# Patient Record
Sex: MALE | Race: ASIAN | NOT HISPANIC OR LATINO
[De-identification: names, ages, dates, MRNs, and addresses within clinical notes are randomized per-mention and may not be internally consistent; named-entity substitution may affect disease eponyms.]

---

## 2021-09-20 PROBLEM — Z00.00 ENCOUNTER FOR PREVENTIVE HEALTH EXAMINATION: Status: ACTIVE | Noted: 2021-09-20

## 2021-09-27 ENCOUNTER — APPOINTMENT (OUTPATIENT)
Dept: CARDIOLOGY | Facility: CLINIC | Age: 76
End: 2021-09-27
Payer: MEDICARE

## 2021-09-27 VITALS
WEIGHT: 127 LBS | HEIGHT: 64.57 IN | OXYGEN SATURATION: 97 % | DIASTOLIC BLOOD PRESSURE: 71 MMHG | HEART RATE: 69 BPM | BODY MASS INDEX: 21.42 KG/M2 | RESPIRATION RATE: 18 BRPM | SYSTOLIC BLOOD PRESSURE: 166 MMHG | TEMPERATURE: 97.6 F

## 2021-09-27 DIAGNOSIS — I10 ESSENTIAL (PRIMARY) HYPERTENSION: ICD-10-CM

## 2021-09-27 DIAGNOSIS — R00.2 PALPITATIONS: ICD-10-CM

## 2021-09-27 DIAGNOSIS — I25.10 ATHEROSCLEROTIC HEART DISEASE OF NATIVE CORONARY ARTERY W/OUT ANGINA PECTORIS: ICD-10-CM

## 2021-09-27 DIAGNOSIS — Z87.891 PERSONAL HISTORY OF NICOTINE DEPENDENCE: ICD-10-CM

## 2021-09-27 DIAGNOSIS — Z98.61 CORONARY ANGIOPLASTY STATUS: ICD-10-CM

## 2021-09-27 PROCEDURE — 93000 ELECTROCARDIOGRAM COMPLETE: CPT

## 2021-09-27 PROCEDURE — 99204 OFFICE O/P NEW MOD 45 MIN: CPT

## 2021-09-27 RX ORDER — TAMSULOSIN HYDROCHLORIDE 0.4 MG/1
0.4 CAPSULE ORAL
Refills: 0 | Status: ACTIVE | COMMUNITY
Start: 2021-09-27

## 2021-09-27 RX ORDER — ASPIRIN 81 MG/1
81 TABLET ORAL
Refills: 0 | Status: ACTIVE | COMMUNITY
Start: 2021-09-27

## 2021-09-27 RX ORDER — FLUOXETINE HYDROCHLORIDE 10 MG/1
10 CAPSULE ORAL
Refills: 0 | Status: ACTIVE | COMMUNITY
Start: 2021-09-27

## 2021-09-27 NOTE — REASON FOR VISIT
[Hypertension] : hypertension [Coronary Artery Disease] : coronary artery disease [FreeTextEntry1] : 76 M HTN CAD s/p PCI 25 y with CP and SOB.

## 2021-09-27 NOTE — HISTORY OF PRESENT ILLNESS
[FreeTextEntry1] : 76 M HTN CAD s/p PCI with CP, SOB and palpitations.\par \par 1. HTN: on medications.\par 2. CAD s/p PCI: patient reports PCI 25 years ago at [facility]. He has not had cardiology f/u.\par He reports worsening CP and SOB over the last few months (about 6). His symptoms are exertional, relieved with rest, lasting minutes and associated with dizziness.\par His CP does not radiate but is bilateral and substernal. His symptoms last a few minutes. He has SOB and has limited ET but denies PND or orthopnea. \par He also has noted intermittent palpitations with dizziness.

## 2021-09-27 NOTE — DISCUSSION/SUMMARY
[FreeTextEntry1] : 76 M HTN CAD s/p PCI with CP, SOB and palpitations.\par CHECK ECHOCARDIOGRAM.\par CHECK HOLTER.\par CHECK NST TO ASSESS PERFUSION.\par Continue ASA 81 QD.

## 2021-09-27 NOTE — PHYSICAL EXAM
[Well Developed] : well developed [Well Nourished] : well nourished [No Acute Distress] : no acute distress [Normal Conjunctiva] : normal conjunctiva [Normal Venous Pressure] : normal venous pressure [No Carotid Bruit] : no carotid bruit [Normal S1, S2] : normal S1, S2 [No Rub] : no rub [No Gallop] : no gallop [Clear Lung Fields] : clear lung fields [Good Air Entry] : good air entry [No Respiratory Distress] : no respiratory distress  [Soft] : abdomen soft [Non Tender] : non-tender [No Masses/organomegaly] : no masses/organomegaly [Normal Bowel Sounds] : normal bowel sounds [Normal Gait] : normal gait [No Edema] : no edema [No Cyanosis] : no cyanosis [No Clubbing] : no clubbing [No Varicosities] : no varicosities [No Rash] : no rash [No Skin Lesions] : no skin lesions [Moves all extremities] : moves all extremities [No Focal Deficits] : no focal deficits [Normal Speech] : normal speech [Alert and Oriented] : alert and oriented [Normal memory] : normal memory [de-identified] : 1/6 ARIANE PAGE

## 2021-10-01 ENCOUNTER — NON-APPOINTMENT (OUTPATIENT)
Age: 76
End: 2021-10-01

## 2021-12-03 ENCOUNTER — APPOINTMENT (OUTPATIENT)
Dept: CARDIOLOGY | Facility: CLINIC | Age: 76
End: 2021-12-03
Payer: MEDICARE

## 2021-12-03 VITALS
SYSTOLIC BLOOD PRESSURE: 179 MMHG | WEIGHT: 124 LBS | DIASTOLIC BLOOD PRESSURE: 74 MMHG | OXYGEN SATURATION: 97 % | TEMPERATURE: 97.8 F | BODY MASS INDEX: 20.91 KG/M2 | HEART RATE: 59 BPM | RESPIRATION RATE: 18 BRPM

## 2021-12-03 DIAGNOSIS — R07.9 CHEST PAIN, UNSPECIFIED: ICD-10-CM

## 2021-12-03 PROCEDURE — 78452 HT MUSCLE IMAGE SPECT MULT: CPT

## 2021-12-03 PROCEDURE — 93015 CV STRESS TEST SUPVJ I&R: CPT

## 2021-12-03 PROCEDURE — 93306 TTE W/DOPPLER COMPLETE: CPT

## 2021-12-03 PROCEDURE — A9500: CPT

## 2021-12-15 ENCOUNTER — APPOINTMENT (OUTPATIENT)
Dept: CARDIOLOGY | Facility: CLINIC | Age: 76
End: 2021-12-15
Payer: MEDICARE

## 2021-12-15 VITALS
WEIGHT: 125 LBS | SYSTOLIC BLOOD PRESSURE: 156 MMHG | HEART RATE: 61 BPM | DIASTOLIC BLOOD PRESSURE: 78 MMHG | RESPIRATION RATE: 12 BRPM | BODY MASS INDEX: 21.34 KG/M2 | HEIGHT: 64 IN

## 2021-12-15 PROCEDURE — 99214 OFFICE O/P EST MOD 30 MIN: CPT

## 2021-12-15 RX ORDER — IRBESARTAN 300 MG/1
300 TABLET ORAL
Refills: 0 | Status: DISCONTINUED | COMMUNITY
Start: 2021-09-27 | End: 2021-12-15

## 2021-12-15 RX ORDER — FUROSEMIDE 20 MG/1
20 TABLET ORAL
Refills: 0 | Status: ACTIVE | COMMUNITY
Start: 2021-12-15

## 2021-12-15 NOTE — HISTORY OF PRESENT ILLNESS
[FreeTextEntry1] :   \par \par 1. HTN: on medications.\par 2. Hyperlipidemia: on statin.\par 3. CAD s/p PCI: patient reports PCI 25 years ago at [facility]. He has not had cardiology f/u.\par He had an echo, NST and holter done.\par Echo showed normal LVEF. NST showed an anterior wall fixed defect. Holter showed PACs.\par \par He was recently at [facility] for palpitations. He was discharged after BP control.\par \par He will be started on Toprol XL 25 QD. He continues to have exertional CP and SOB that occurs several times/ day and are relieved with rest.\par

## 2021-12-15 NOTE — REASON FOR VISIT
[Arrhythmia/ECG Abnorrmalities] : arrhythmia/ECG abnormalities [Hyperlipidemia] : hyperlipidemia [Hypertension] : hypertension [Coronary Artery Disease] : coronary artery disease [FreeTextEntry1] : 76 M HTN CAD s/p PCI 25 y with CP and SOB. \par

## 2021-12-15 NOTE — DISCUSSION/SUMMARY
[FreeTextEntry1] : 76 M HTN hyperlipidemia CAD s/p PCI with CP and SOB and palpitations.\par CHECK CARDIAC CTA.\par START TOPROL XL 25.\par Continue medications for HTN and hyperlipidemia.\par Continue ASA for CAD.

## 2021-12-15 NOTE — PHYSICAL EXAM
[Well Developed] : well developed [Well Nourished] : well nourished [No Acute Distress] : no acute distress [Normal Conjunctiva] : normal conjunctiva [Normal Venous Pressure] : normal venous pressure [No Carotid Bruit] : no carotid bruit [Normal S1, S2] : normal S1, S2 [No Rub] : no rub [No Gallop] : no gallop [Clear Lung Fields] : clear lung fields [Good Air Entry] : good air entry [No Respiratory Distress] : no respiratory distress  [Soft] : abdomen soft [Non Tender] : non-tender [No Masses/organomegaly] : no masses/organomegaly [Normal Bowel Sounds] : normal bowel sounds [Normal Gait] : normal gait [No Edema] : no edema [No Cyanosis] : no cyanosis [No Clubbing] : no clubbing [No Varicosities] : no varicosities [No Rash] : no rash [No Skin Lesions] : no skin lesions [Moves all extremities] : moves all extremities [No Focal Deficits] : no focal deficits [Normal Speech] : normal speech [Alert and Oriented] : alert and oriented [Normal memory] : normal memory [de-identified] : 1/6 ARIANE PAGE

## 2021-12-23 ENCOUNTER — NON-APPOINTMENT (OUTPATIENT)
Age: 76
End: 2021-12-23

## 2022-01-19 ENCOUNTER — APPOINTMENT (OUTPATIENT)
Dept: CARDIOLOGY | Facility: CLINIC | Age: 77
End: 2022-01-19
Payer: MEDICARE

## 2022-01-19 VITALS
HEART RATE: 76 BPM | WEIGHT: 125 LBS | HEIGHT: 64 IN | DIASTOLIC BLOOD PRESSURE: 82 MMHG | RESPIRATION RATE: 15 BRPM | SYSTOLIC BLOOD PRESSURE: 154 MMHG | BODY MASS INDEX: 21.34 KG/M2

## 2022-01-19 PROCEDURE — 93000 ELECTROCARDIOGRAM COMPLETE: CPT

## 2022-01-19 PROCEDURE — 99214 OFFICE O/P EST MOD 30 MIN: CPT

## 2022-01-19 RX ORDER — IRBESARTAN AND HYDROCHLOROTHIAZIDE 300; 12.5 MG/1; MG/1
300-12.5 TABLET ORAL
Refills: 0 | Status: ACTIVE | COMMUNITY
Start: 2022-01-19

## 2022-01-19 RX ORDER — METOPROLOL SUCCINATE 25 MG/1
25 TABLET, EXTENDED RELEASE ORAL DAILY
Qty: 30 | Refills: 1 | Status: DISCONTINUED | COMMUNITY
Start: 2021-12-15 | End: 2022-01-19

## 2022-01-19 RX ORDER — LABETALOL HYDROCHLORIDE 100 MG/1
100 TABLET, FILM COATED ORAL
Refills: 0 | Status: ACTIVE | COMMUNITY
Start: 2022-01-19

## 2022-01-19 RX ORDER — IRBESARTAN 300 MG/1
300 TABLET ORAL
Refills: 0 | Status: ACTIVE | COMMUNITY
Start: 2022-01-19

## 2022-01-19 RX ORDER — AMLODIPINE AND OLMESARTAN MEDOXOMIL 5; 40 MG/1; MG/1
5-40 TABLET ORAL
Refills: 0 | Status: DISCONTINUED | COMMUNITY
Start: 2021-12-15 | End: 2022-01-19

## 2022-01-19 NOTE — PHYSICAL EXAM
[Well Developed] : well developed [Well Nourished] : well nourished [No Acute Distress] : no acute distress [Normal Conjunctiva] : normal conjunctiva [Normal Venous Pressure] : normal venous pressure [No Carotid Bruit] : no carotid bruit [Normal S1, S2] : normal S1, S2 [No Rub] : no rub [No Gallop] : no gallop [Clear Lung Fields] : clear lung fields [Good Air Entry] : good air entry [No Respiratory Distress] : no respiratory distress  [Soft] : abdomen soft [Non Tender] : non-tender [No Masses/organomegaly] : no masses/organomegaly [Normal Bowel Sounds] : normal bowel sounds [Normal Gait] : normal gait [No Edema] : no edema [No Cyanosis] : no cyanosis [No Clubbing] : no clubbing [No Varicosities] : no varicosities [No Rash] : no rash [No Skin Lesions] : no skin lesions [Moves all extremities] : moves all extremities [No Focal Deficits] : no focal deficits [Normal Speech] : normal speech [Alert and Oriented] : alert and oriented [Normal memory] : normal memory [de-identified] : 1/6 ARIANE PAGE

## 2022-01-19 NOTE — REASON FOR VISIT
[Hyperlipidemia] : hyperlipidemia [Hypertension] : hypertension [Coronary Artery Disease] : coronary artery disease [FreeTextEntry1] : 76 M HTN CAD s/p PCI 25 y with CP and SOB. \par

## 2022-01-19 NOTE — DISCUSSION/SUMMARY
[FreeTextEntry1] : 76 M HTN hyperlipidemia CAD s/p PCI with worsening CP and abnormal CTA.\par Continue ASA 81 for CAD.\par Due to worsening symptoms and abnormal CTA, refer for cardiac catheterization.\par Continue medications for HTN and hyperlipidemia.\par NG 0.4 mg prn for CP.\par

## 2022-01-19 NOTE — CARDIOLOGY SUMMARY
Lab Results   Component Value Date    HGBA1C 6 1 (H) 05/16/2021       Recent Labs     05/19/21  1640 05/19/21  2110 05/20/21  0707 05/20/21  1106   POCGLU 119 155* 129 160*       Blood Sugar Average: Last 72 hrs:  (P) 132 [id number]   · Controlled continue sliding scale [de-identified] : 1-19-22 sinus joon 59 bpm nl axis no ST changes

## 2022-01-19 NOTE — HISTORY OF PRESENT ILLNESS
[FreeTextEntry1] :  \par 1. HTN: on medications. No SE are noted.\par 2. Hyperlipidemia: on statin. The lipid profile is followed by PMD.\par 3. CAD s/p PCI: patient reports PCI 25 years ago at [facility]. He has not had cardiology f/u.\par He had an echo, NST and holter done.\par Echo showed normal LVEF. NST showed an anterior wall fixed defect. Holter showed PACs.\par \par He was recently at [facility] for palpitations. He was discharged after BP control.\par \par His BP meds were changed to irbesartan and labetalol. His BP control has improved.\par He continues to have exertional CP and SOB.\par Cardiac CTA showed multiple stenosis including a moderate to severe lesion in the LAD.\par \par The patient received his COVID vaccine.

## 2022-01-24 VITALS
HEIGHT: 64.96 IN | TEMPERATURE: 100 F | RESPIRATION RATE: 16 BRPM | OXYGEN SATURATION: 95 % | HEART RATE: 62 BPM | SYSTOLIC BLOOD PRESSURE: 174 MMHG | WEIGHT: 125 LBS | DIASTOLIC BLOOD PRESSURE: 77 MMHG

## 2022-01-24 RX ORDER — CHLORHEXIDINE GLUCONATE 213 G/1000ML
1 SOLUTION TOPICAL ONCE
Refills: 0 | Status: DISCONTINUED | OUTPATIENT
Start: 2022-01-28 | End: 2022-02-11

## 2022-01-24 NOTE — H&P ADULT - NSHPLABSRESULTS_GEN_ALL_CORE
Labs:               12.6   5.85  )-----------( 231      ( 28 Jan 2022 11:06 )             39.0       01-28    140  |  104  |  17  ----------------------------<  112<H>  4.5   |  26  |  0.77    Ca    9.5      28 Jan 2022 11:06    TPro  7.5  /  Alb  4.7  /  TBili  0.5  /  DBili  x   /  AST  16  /  ALT  17  /  AlkPhos  63  01-28      PT/INR - ( 28 Jan 2022 11:06 )   PT: 12.5 sec;   INR: 1.04          PTT - ( 28 Jan 2022 11:06 )  PTT:34.7 sec    CARDIAC MARKERS ( 28 Jan 2022 11:06 )  x     / x     / 72 U/L / x     / 1.2 ng/mL      EKG: NSR 70bpm, L axis, no acute ischemia

## 2022-01-24 NOTE — H&P ADULT - ASSESSMENT
77yo Thai-speaking Male w/ PMHx of HTN, HLD, BPH, CAD (s/p PCI 25yrs ago at [facility]), prostate cancer (s/p surgery - 5 years ago), presents for cardiac cath in light of patient's risk factors, CCS Class III Anginal symptoms, and abnormal CCTA/NST.    -ASA II, Mallampati II  -suitable candidate for moderate sedation  -DAPT: pt compliant with aspirin, last dose 1/27; will give aspirin 81mg and plavix 600mg  -IVF: NS 250cc bolus (next case)  -T 100.2 but pt denies any infectious symptoms or sick contacts, no leukocytosis  -cath consent obtained via Language Line ID # [id number]    Risks & benefits of procedure and alternative therapy have been explained to the patient including but not limited to: allergic reaction, bleeding w/possible need for blood transfusion, infection, renal and vascular compromise, limb damage, arrhythmia, stroke, vessel dissection/perforation, Myocardial infarction, emergent CABG.

## 2022-01-24 NOTE — H&P ADULT - ATTENDING COMMENTS
Please see PA note for full details.  I have reviewed the case, examined the patient and agree with plan.  Patient underwent PCI for severe mid LAD and LCx stenosis.  Continue ASA and plavix.  Increase atorvastatin to 40mg hs.

## 2022-01-24 NOTE — H&P ADULT - HISTORY OF PRESENT ILLNESS
CARDIOLOGIST: Dr. Carvajal  COVID:   PHARMACY:   ESCORT:     Pt is 75yo Male w/ PMHx of HTN, HLD, BPH, CAD (s/p PCI 25yrs ago at [facility]) who presented to his cardiologist, Dr. Carvajal, endorsing exertional CP and SOB. Pt denies palpitations, dizziness/syncope, abdominal pain, N/V, fever/chills, orthopnea, PND, LE edema.     TTE (12/3/21): LVEF 70%, mild-mod TR, mild MR, PASP 43mmHg. CCTA (12/27/2021): Ca score 345 (81st %tile), moderate stenosis dLAD, mild stenosis LM/D1/RCA/PDA/PLV, minimal stenisosi LCx/OM. NST (12/3/21): small, mild defect anterior wall.    In light of patient's risk factors, CCS Class III Anginal symptoms, and abnormal CCTA/NST, patient now presents to [facility] for cardiac catheterization with possible intervention if clinically indicated.  CARDIOLOGIST: Dr. Carvajal  COVID: 1/25/22 at [facility])  PHARMACY:   ESCORT: Son, Yeo Yoon     confirm medications and pharmacy on arrival*     Pt is 77yo Male w/ PMHx of HTN, HLD, BPH, CAD (s/p PCI 25yrs ago at [facility]), prostate cancer (s/p surgery - 5 years ago),  who presented to his cardiologist, Dr. Carvajal, endorsing exertional CP and SOB. Pt denies palpitations, dizziness/syncope, abdominal pain, N/V, fever/chills, orthopnea, PND, LE edema. TTE (12/3/21): LVEF 70%, mild-mod TR, mild MR, PASP 43mmHg. CCTA (12/27/2021): Ca score 345 (81st %tile), moderate stenosis dLAD, mild stenosis LM/D1/RCA/PDA/PLV, minimal stenisosi LCx/OM. NST (12/3/21): small, mild defect anterior wall.    In light of patient's risk factors, CCS Class III Anginal symptoms, and abnormal CCTA/NST, patient now presents to [facility] for cardiac catheterization with possible intervention if clinically indicated.  CARDIOLOGIST: Dr. Carvajal  COVID: 1/25/22 at [facility])-Negative in HIE  PHARMACY:   ESCORT: Son, Yeo Yoon     confirm medications and pharmacy on arrival*     Pt is 77yo Male w/ PMHx of HTN, HLD, BPH, CAD (s/p PCI 25yrs ago at [facility]), prostate cancer (s/p surgery - 5 years ago),  who presented to his cardiologist, Dr. Carvajal, endorsing exertional CP and SOB. Pt denies palpitations, dizziness/syncope, abdominal pain, N/V, fever/chills, orthopnea, PND, LE edema. TTE (12/3/21): LVEF 70%, mild-mod TR, mild MR, PASP 43mmHg. CCTA (12/27/2021): Ca score 345 (81st %tile), moderate stenosis dLAD, mild stenosis LM/D1/RCA/PDA/PLV, minimal stenisosi LCx/OM. NST (12/3/21): small, mild defect anterior wall.    In light of patient's risk factors, CCS Class III Anginal symptoms, and abnormal CCTA/NST, patient now presents to [facility] for cardiac catheterization with possible intervention if clinically indicated.  CARDIOLOGIST: Dr. Carvajal  COVID: 1/25/22 at [facility])-Negative in HIE  PHARMACY: [facility], [place]  ESCORT: Son, Yeo Yoon     75yo Bulgarian-speaking Male w/ PMHx of HTN, HLD, BPH, CAD (s/p PCI 25yrs ago at [facility]), prostate cancer (s/p surgery - 5 years ago),  who presented to his cardiologist, Dr. Carvajal, endorsing exertional CP and SOB. Pt denies palpitations, dizziness/syncope, abdominal pain, N/V, fever/chills, orthopnea, PND, LE edema, URI symptoms, or sick contacts. TTE (12/3/21): LVEF 70%, mild-mod TR, mild MR, PASP 43mmHg. CCTA (12/27/2021): Ca score 345 (81st %tile), moderate stenosis dLAD, mild stenosis LM/D1/RCA/PDA/PLV, minimal stenisosi LCx/OM. NST (12/3/21): small, mild defect anterior wall.    In light of patient's risk factors, CCS Class III Anginal symptoms, and abnormal CCTA/NST, patient now presents to [facility] for cardiac catheterization with possible intervention if clinically indicated.

## 2022-01-28 ENCOUNTER — OUTPATIENT (OUTPATIENT)
Dept: OUTPATIENT SERVICES | Facility: HOSPITAL | Age: 77
LOS: 1 days | Discharge: HOME | End: 2022-01-28
Payer: MEDICARE

## 2022-01-28 LAB
A1C WITH ESTIMATED AVERAGE GLUCOSE RESULT: 5.6 % — SIGNIFICANT CHANGE UP (ref 4–5.6)
ALBUMIN SERPL ELPH-MCNC: 4.2 G/DL — SIGNIFICANT CHANGE UP (ref 3.3–5)
ALBUMIN SERPL ELPH-MCNC: 4.7 G/DL — SIGNIFICANT CHANGE UP (ref 3.3–5)
ALP SERPL-CCNC: 54 U/L — SIGNIFICANT CHANGE UP (ref 40–120)
ALP SERPL-CCNC: 63 U/L — SIGNIFICANT CHANGE UP (ref 40–120)
ALT FLD-CCNC: 12 U/L — SIGNIFICANT CHANGE UP (ref 10–45)
ALT FLD-CCNC: 17 U/L — SIGNIFICANT CHANGE UP (ref 10–45)
ANION GAP SERPL CALC-SCNC: 10 MMOL/L — SIGNIFICANT CHANGE UP (ref 5–17)
ANION GAP SERPL CALC-SCNC: 11 MMOL/L — SIGNIFICANT CHANGE UP (ref 5–17)
APTT BLD: 34.7 SEC — SIGNIFICANT CHANGE UP (ref 27.5–35.5)
AST SERPL-CCNC: 13 U/L — SIGNIFICANT CHANGE UP (ref 10–40)
AST SERPL-CCNC: 16 U/L — SIGNIFICANT CHANGE UP (ref 10–40)
BASOPHILS # BLD AUTO: 0.03 K/UL — SIGNIFICANT CHANGE UP (ref 0–0.2)
BASOPHILS # BLD AUTO: 0.03 K/UL — SIGNIFICANT CHANGE UP (ref 0–0.2)
BASOPHILS NFR BLD AUTO: 0.5 % — SIGNIFICANT CHANGE UP (ref 0–2)
BASOPHILS NFR BLD AUTO: 0.5 % — SIGNIFICANT CHANGE UP (ref 0–2)
BILIRUB SERPL-MCNC: 0.5 MG/DL — SIGNIFICANT CHANGE UP (ref 0.2–1.2)
BILIRUB SERPL-MCNC: 0.5 MG/DL — SIGNIFICANT CHANGE UP (ref 0.2–1.2)
BUN SERPL-MCNC: 14 MG/DL — SIGNIFICANT CHANGE UP (ref 7–23)
BUN SERPL-MCNC: 17 MG/DL — SIGNIFICANT CHANGE UP (ref 7–23)
CALCIUM SERPL-MCNC: 8.5 MG/DL — SIGNIFICANT CHANGE UP (ref 8.4–10.5)
CALCIUM SERPL-MCNC: 9.5 MG/DL — SIGNIFICANT CHANGE UP (ref 8.4–10.5)
CHLORIDE SERPL-SCNC: 104 MMOL/L — SIGNIFICANT CHANGE UP (ref 96–108)
CHLORIDE SERPL-SCNC: 109 MMOL/L — HIGH (ref 96–108)
CHOLEST SERPL-MCNC: 127 MG/DL — SIGNIFICANT CHANGE UP
CK MB CFR SERPL CALC: 1.2 NG/ML — SIGNIFICANT CHANGE UP (ref 0–6.7)
CK SERPL-CCNC: 72 U/L — SIGNIFICANT CHANGE UP (ref 30–200)
CO2 SERPL-SCNC: 23 MMOL/L — SIGNIFICANT CHANGE UP (ref 22–31)
CO2 SERPL-SCNC: 26 MMOL/L — SIGNIFICANT CHANGE UP (ref 22–31)
CREAT SERPL-MCNC: 0.71 MG/DL — SIGNIFICANT CHANGE UP (ref 0.5–1.3)
CREAT SERPL-MCNC: 0.77 MG/DL — SIGNIFICANT CHANGE UP (ref 0.5–1.3)
EOSINOPHIL # BLD AUTO: 0.03 K/UL — SIGNIFICANT CHANGE UP (ref 0–0.5)
EOSINOPHIL # BLD AUTO: 0.06 K/UL — SIGNIFICANT CHANGE UP (ref 0–0.5)
EOSINOPHIL NFR BLD AUTO: 0.5 % — SIGNIFICANT CHANGE UP (ref 0–6)
EOSINOPHIL NFR BLD AUTO: 1 % — SIGNIFICANT CHANGE UP (ref 0–6)
ESTIMATED AVERAGE GLUCOSE: 114 MG/DL — SIGNIFICANT CHANGE UP (ref 68–114)
GLUCOSE SERPL-MCNC: 112 MG/DL — HIGH (ref 70–99)
GLUCOSE SERPL-MCNC: 144 MG/DL — HIGH (ref 70–99)
HCT VFR BLD CALC: 34.1 % — LOW (ref 39–50)
HCT VFR BLD CALC: 39 % — SIGNIFICANT CHANGE UP (ref 39–50)
HCV AB S/CO SERPL IA: 0.05 S/CO — SIGNIFICANT CHANGE UP
HCV AB SERPL-IMP: SIGNIFICANT CHANGE UP
HDLC SERPL-MCNC: 54 MG/DL — SIGNIFICANT CHANGE UP
HGB BLD-MCNC: 11.7 G/DL — LOW (ref 13–17)
HGB BLD-MCNC: 12.6 G/DL — LOW (ref 13–17)
IMM GRANULOCYTES NFR BLD AUTO: 0.2 % — SIGNIFICANT CHANGE UP (ref 0–1.5)
IMM GRANULOCYTES NFR BLD AUTO: 0.3 % — SIGNIFICANT CHANGE UP (ref 0–1.5)
INR BLD: 1.04 — SIGNIFICANT CHANGE UP (ref 0.88–1.16)
LIPID PNL WITH DIRECT LDL SERPL: 58 MG/DL — SIGNIFICANT CHANGE UP
LYMPHOCYTES # BLD AUTO: 1.64 K/UL — SIGNIFICANT CHANGE UP (ref 1–3.3)
LYMPHOCYTES # BLD AUTO: 2.04 K/UL — SIGNIFICANT CHANGE UP (ref 1–3.3)
LYMPHOCYTES # BLD AUTO: 25.3 % — SIGNIFICANT CHANGE UP (ref 13–44)
LYMPHOCYTES # BLD AUTO: 34.9 % — SIGNIFICANT CHANGE UP (ref 13–44)
MAGNESIUM SERPL-MCNC: 1.8 MG/DL — SIGNIFICANT CHANGE UP (ref 1.6–2.6)
MCHC RBC-ENTMCNC: 28.3 PG — SIGNIFICANT CHANGE UP (ref 27–34)
MCHC RBC-ENTMCNC: 29.5 PG — SIGNIFICANT CHANGE UP (ref 27–34)
MCHC RBC-ENTMCNC: 32.3 GM/DL — SIGNIFICANT CHANGE UP (ref 32–36)
MCHC RBC-ENTMCNC: 34.3 GM/DL — SIGNIFICANT CHANGE UP (ref 32–36)
MCV RBC AUTO: 85.9 FL — SIGNIFICANT CHANGE UP (ref 80–100)
MCV RBC AUTO: 87.6 FL — SIGNIFICANT CHANGE UP (ref 80–100)
MONOCYTES # BLD AUTO: 0.38 K/UL — SIGNIFICANT CHANGE UP (ref 0–0.9)
MONOCYTES # BLD AUTO: 0.45 K/UL — SIGNIFICANT CHANGE UP (ref 0–0.9)
MONOCYTES NFR BLD AUTO: 6.5 % — SIGNIFICANT CHANGE UP (ref 2–14)
MONOCYTES NFR BLD AUTO: 7 % — SIGNIFICANT CHANGE UP (ref 2–14)
NEUTROPHILS # BLD AUTO: 3.33 K/UL — SIGNIFICANT CHANGE UP (ref 1.8–7.4)
NEUTROPHILS # BLD AUTO: 4.3 K/UL — SIGNIFICANT CHANGE UP (ref 1.8–7.4)
NEUTROPHILS NFR BLD AUTO: 56.9 % — SIGNIFICANT CHANGE UP (ref 43–77)
NEUTROPHILS NFR BLD AUTO: 66.4 % — SIGNIFICANT CHANGE UP (ref 43–77)
NON HDL CHOLESTEROL: 73 MG/DL — SIGNIFICANT CHANGE UP
NRBC # BLD: 0 /100 WBCS — SIGNIFICANT CHANGE UP (ref 0–0)
NRBC # BLD: 0 /100 WBCS — SIGNIFICANT CHANGE UP (ref 0–0)
PLATELET # BLD AUTO: 206 K/UL — SIGNIFICANT CHANGE UP (ref 150–400)
PLATELET # BLD AUTO: 231 K/UL — SIGNIFICANT CHANGE UP (ref 150–400)
POTASSIUM SERPL-MCNC: 4.1 MMOL/L — SIGNIFICANT CHANGE UP (ref 3.5–5.3)
POTASSIUM SERPL-MCNC: 4.5 MMOL/L — SIGNIFICANT CHANGE UP (ref 3.5–5.3)
POTASSIUM SERPL-SCNC: 4.1 MMOL/L — SIGNIFICANT CHANGE UP (ref 3.5–5.3)
POTASSIUM SERPL-SCNC: 4.5 MMOL/L — SIGNIFICANT CHANGE UP (ref 3.5–5.3)
PROT SERPL-MCNC: 6.3 G/DL — SIGNIFICANT CHANGE UP (ref 6–8.3)
PROT SERPL-MCNC: 7.5 G/DL — SIGNIFICANT CHANGE UP (ref 6–8.3)
PROTHROM AB SERPL-ACNC: 12.5 SEC — SIGNIFICANT CHANGE UP (ref 10.6–13.6)
RBC # BLD: 3.97 M/UL — LOW (ref 4.2–5.8)
RBC # BLD: 4.45 M/UL — SIGNIFICANT CHANGE UP (ref 4.2–5.8)
RBC # FLD: 12.4 % — SIGNIFICANT CHANGE UP (ref 10.3–14.5)
RBC # FLD: 12.4 % — SIGNIFICANT CHANGE UP (ref 10.3–14.5)
SODIUM SERPL-SCNC: 140 MMOL/L — SIGNIFICANT CHANGE UP (ref 135–145)
SODIUM SERPL-SCNC: 143 MMOL/L — SIGNIFICANT CHANGE UP (ref 135–145)
TRIGL SERPL-MCNC: 76 MG/DL — SIGNIFICANT CHANGE UP
WBC # BLD: 5.85 K/UL — SIGNIFICANT CHANGE UP (ref 3.8–10.5)
WBC # BLD: 6.47 K/UL — SIGNIFICANT CHANGE UP (ref 3.8–10.5)
WBC # FLD AUTO: 5.85 K/UL — SIGNIFICANT CHANGE UP (ref 3.8–10.5)
WBC # FLD AUTO: 6.47 K/UL — SIGNIFICANT CHANGE UP (ref 3.8–10.5)

## 2022-01-28 PROCEDURE — C1874: CPT

## 2022-01-28 PROCEDURE — 93010 ELECTROCARDIOGRAM REPORT: CPT | Mod: 76

## 2022-01-28 PROCEDURE — C1760: CPT

## 2022-01-28 PROCEDURE — 93005 ELECTROCARDIOGRAM TRACING: CPT

## 2022-01-28 PROCEDURE — C1725: CPT

## 2022-01-28 PROCEDURE — 83036 HEMOGLOBIN GLYCOSYLATED A1C: CPT

## 2022-01-28 PROCEDURE — 85730 THROMBOPLASTIN TIME PARTIAL: CPT

## 2022-01-28 PROCEDURE — 82550 ASSAY OF CK (CPK): CPT

## 2022-01-28 PROCEDURE — C1769: CPT

## 2022-01-28 PROCEDURE — 99152 MOD SED SAME PHYS/QHP 5/>YRS: CPT

## 2022-01-28 PROCEDURE — C1889: CPT

## 2022-01-28 PROCEDURE — 85025 COMPLETE CBC W/AUTO DIFF WBC: CPT

## 2022-01-28 PROCEDURE — 86803 HEPATITIS C AB TEST: CPT

## 2022-01-28 PROCEDURE — 85610 PROTHROMBIN TIME: CPT

## 2022-01-28 PROCEDURE — 93458 L HRT ARTERY/VENTRICLE ANGIO: CPT | Mod: 59

## 2022-01-28 PROCEDURE — 80061 LIPID PANEL: CPT

## 2022-01-28 PROCEDURE — 99219: CPT

## 2022-01-28 PROCEDURE — 92933 PRQ TRLML C ATHRC ST ANGIOP1: CPT | Mod: LD

## 2022-01-28 PROCEDURE — 80053 COMPREHEN METABOLIC PANEL: CPT

## 2022-01-28 PROCEDURE — 83735 ASSAY OF MAGNESIUM: CPT

## 2022-01-28 PROCEDURE — 93458 L HRT ARTERY/VENTRICLE ANGIO: CPT | Mod: 26,59

## 2022-01-28 PROCEDURE — 92928 PRQ TCAT PLMT NTRAC ST 1 LES: CPT | Mod: LC

## 2022-01-28 PROCEDURE — C1724: CPT

## 2022-01-28 PROCEDURE — C1887: CPT

## 2022-01-28 PROCEDURE — C1894: CPT

## 2022-01-28 PROCEDURE — 99153 MOD SED SAME PHYS/QHP EA: CPT

## 2022-01-28 PROCEDURE — C9600: CPT | Mod: LC

## 2022-01-28 PROCEDURE — 82553 CREATINE MB FRACTION: CPT

## 2022-01-28 PROCEDURE — C9602: CPT | Mod: LD

## 2022-01-28 RX ORDER — MAGNESIUM OXIDE 400 MG ORAL TABLET 241.3 MG
800 TABLET ORAL ONCE
Refills: 0 | Status: COMPLETED | OUTPATIENT
Start: 2022-01-28 | End: 2022-01-28

## 2022-01-28 RX ORDER — SODIUM CHLORIDE 9 MG/ML
500 INJECTION INTRAMUSCULAR; INTRAVENOUS; SUBCUTANEOUS
Refills: 0 | Status: DISCONTINUED | OUTPATIENT
Start: 2022-01-28 | End: 2022-02-11

## 2022-01-28 RX ORDER — ASPIRIN/CALCIUM CARB/MAGNESIUM 324 MG
1 TABLET ORAL
Qty: 0 | Refills: 0 | DISCHARGE

## 2022-01-28 RX ORDER — ASPIRIN/CALCIUM CARB/MAGNESIUM 324 MG
1 TABLET ORAL
Qty: 30 | Refills: 0
Start: 2022-01-28 | End: 2022-02-26

## 2022-01-28 RX ORDER — IRBESARTAN AND HYDROCHLOROTHIAZIDE 12.5; 3 MG/1; MG/1
1 TABLET ORAL
Qty: 0 | Refills: 0 | DISCHARGE

## 2022-01-28 RX ORDER — CLOPIDOGREL BISULFATE 75 MG/1
600 TABLET, FILM COATED ORAL ONCE
Refills: 0 | Status: COMPLETED | OUTPATIENT
Start: 2022-01-28 | End: 2022-01-28

## 2022-01-28 RX ORDER — FUROSEMIDE 40 MG
1 TABLET ORAL
Qty: 0 | Refills: 0 | DISCHARGE

## 2022-01-28 RX ORDER — SODIUM CHLORIDE 9 MG/ML
250 INJECTION INTRAMUSCULAR; INTRAVENOUS; SUBCUTANEOUS ONCE
Refills: 0 | Status: COMPLETED | OUTPATIENT
Start: 2022-01-28 | End: 2022-01-28

## 2022-01-28 RX ORDER — TAMSULOSIN HYDROCHLORIDE 0.4 MG/1
1 CAPSULE ORAL
Qty: 0 | Refills: 0 | DISCHARGE

## 2022-01-28 RX ORDER — ATORVASTATIN CALCIUM 80 MG/1
1 TABLET, FILM COATED ORAL
Qty: 30 | Refills: 0
Start: 2022-01-28 | End: 2022-02-26

## 2022-01-28 RX ORDER — ASPIRIN/CALCIUM CARB/MAGNESIUM 324 MG
1 TABLET ORAL
Qty: 30 | Refills: 11
Start: 2022-01-28 | End: 2023-01-22

## 2022-01-28 RX ORDER — ASPIRIN/CALCIUM CARB/MAGNESIUM 324 MG
81 TABLET ORAL ONCE
Refills: 0 | Status: COMPLETED | OUTPATIENT
Start: 2022-01-28 | End: 2022-01-28

## 2022-01-28 RX ORDER — CLOPIDOGREL BISULFATE 75 MG/1
1 TABLET, FILM COATED ORAL
Qty: 30 | Refills: 11
Start: 2022-01-28 | End: 2023-01-22

## 2022-01-28 RX ORDER — LABETALOL HCL 100 MG
0 TABLET ORAL
Qty: 0 | Refills: 0 | DISCHARGE

## 2022-01-28 RX ORDER — ATORVASTATIN CALCIUM 80 MG/1
1 TABLET, FILM COATED ORAL
Qty: 30 | Refills: 3
Start: 2022-01-28 | End: 2022-05-27

## 2022-01-28 RX ORDER — CLOPIDOGREL BISULFATE 75 MG/1
1 TABLET, FILM COATED ORAL
Qty: 30 | Refills: 0
Start: 2022-01-28 | End: 2022-02-26

## 2022-01-28 RX ORDER — ATORVASTATIN CALCIUM 80 MG/1
1 TABLET, FILM COATED ORAL
Qty: 0 | Refills: 0 | DISCHARGE

## 2022-01-28 RX ADMIN — SODIUM CHLORIDE 500 MILLILITER(S): 9 INJECTION INTRAMUSCULAR; INTRAVENOUS; SUBCUTANEOUS at 12:18

## 2022-01-28 RX ADMIN — SODIUM CHLORIDE 160 MILLILITER(S): 9 INJECTION INTRAMUSCULAR; INTRAVENOUS; SUBCUTANEOUS at 16:00

## 2022-01-28 RX ADMIN — CLOPIDOGREL BISULFATE 600 MILLIGRAM(S): 75 TABLET, FILM COATED ORAL at 12:17

## 2022-01-28 RX ADMIN — Medication 81 MILLIGRAM(S): at 12:17

## 2022-01-28 RX ADMIN — MAGNESIUM OXIDE 400 MG ORAL TABLET 800 MILLIGRAM(S): 241.3 TABLET ORAL at 19:07

## 2022-01-28 NOTE — PROGRESS NOTE ADULT - SUBJECTIVE AND OBJECTIVE BOX
Interventional Cardiology PA Post PCI SDA Discharge Note     Afebrile, VSS    Ext:    		Right groin: s/p perclose: no hematoma, no bleed, distal pulse intact         A/P:  77yo German-speaking Male w/ PMHx of HTN, HLD, BPH, CAD (s/p PCI 25yrs ago at [facility]), prostate cancer (s/p surgery - 5 years ago),  who presented to his cardiologist, Dr. Carvajal, endorsing exertional CP and SOB. Pt denies palpitations, dizziness/syncope, abdominal pain, N/V, fever/chills, orthopnea, PND, LE edema, URI symptoms, or sick contacts. TTE (12/3/21): LVEF 70%, mild-mod TR, mild MR, PASP 43mmHg. CCTA (12/27/2021): Ca score 345 (81st %tile), moderate stenosis dLAD, mild stenosis LM/D1/RCA/PDA/PLV, minimal stenisosi LCx/OM. NST (12/3/21): small, mild defect anterior wall.    In light of patient's risk factors, CCS Class III Anginal symptoms, and abnormal CCTA/NST, patient was referred for cardiac catheterization (1/28/222) revealing mild LM disease,    pLAD 20% stenosis, mLAD 80% calcified, mild D1 disease, pLCx INSERt, mild OM1 disease, RCA/RPDA mild disease. EDP 5 mmHG. Intervention: CISI/MARTÍN mLAD and MARTÍN pLCx. Patient prescribed ASA 81 mg daily and Plavix 75 mg daily to preferred pharmacy. In addition, patient to continue Atorvastatin 10 mg daily, Labetolol 200 mh BID, Irbesartan 300 mg daily. Patient will follow up with Dr. Carvajal within 72 hours for a check up.         1.	Follow-up with PMD/Cardiologist  Dr. Carvajal in 72 hours.  2.                 Post procedure labs/EKG reviewed and stable.    3.                 Pt given instructions on importance of taking antiplatelet medication.    4. 	Stable for discharge as per attending Dr. Mg after bed rest, pt voids, groin/wrist stable and 30 minutes of ambulation.  5.                 Prescriptions for Aspirin/Plavix e-prescribed adn submitted to patient's pharmacy.    6.                 Patient will continue statin Atorvastatin 10 mg daily.   7.	Discharge forms signed and copies in chart   Interventional Cardiology PA Post PCI SDA Discharge Note     Afebrile, VSS    Ext:    		Right groin: s/p perclose: no hematoma, no bleed, distal pulse intact         A/P:  77yo Luxembourgish-speaking Male w/ PMHx of HTN, HLD, BPH, CAD (s/p PCI 25yrs ago at [facility]), prostate cancer (s/p surgery - 5 years ago),  who presented to his cardiologist, Dr. Carvajal, endorsing exertional CP and SOB. Pt denies palpitations, dizziness/syncope, abdominal pain, N/V, fever/chills, orthopnea, PND, LE edema, URI symptoms, or sick contacts. TTE (12/3/21): LVEF 70%, mild-mod TR, mild MR, PASP 43mmHg. CCTA (12/27/2021): Ca score 345 (81st %tile), moderate stenosis dLAD, mild stenosis LM/D1/RCA/PDA/PLV, minimal stenisosi LCx/OM. NST (12/3/21): small, mild defect anterior wall.    In light of patient's risk factors, CCS Class III Anginal symptoms, and abnormal CCTA/NST, patient was referred for cardiac catheterization (1/28/222) revealing mild LM disease,    pLAD 20% stenosis, mLAD 80% calcified, mild D1 disease, pLCx disease, mild OM1 disease, RCA/RPDA mild disease. EDP 5 mmHG. Intervention: CISI/MARTÍN mLAD and MARTÍN pLCx. Patient prescribed ASA 81 mg daily and Plavix 75 mg daily to preferred pharmacy (in addition to scripts being sent to [facility] for patient to  tonight prior to discharge). In addition, patient to continue Atorvastatin increased 40 mg daily, Labetolol 200 mh BID, Irbesartan 300 mg daily. Patient will follow up with Dr. Carvajal within 72 hours for a check up.         1.	Follow-up with PMD/Cardiologist  Dr. Carvajal in 72 hours.  2.                 Post procedure labs/EKG reviewed and stable.    3.                 Pt given instructions on importance of taking antiplatelet medication.    4. 	Stable for discharge as per attending Dr. Mg after bed rest, pt voids, groin/wrist stable and 30 minutes of ambulation.  5.                 Prescriptions for Aspirin/Plavix e-prescribed adn submitted to patient's pharmacy.    6.                 Patient will continue statin Atorvastatin 10 mg daily.   7.	Discharge forms signed and copies in chart   Interventional Cardiology PA Post PCI SDA Discharge Note     Afebrile, VSS    Ext:    		Right groin: s/p perclose: no hematoma, no bleed, distal pulse intact         A/P:  77yo German-speaking Male w/ PMHx of HTN, HLD, BPH, CAD (s/p PCI 25yrs ago at [facility]), prostate cancer (s/p surgery - 5 years ago),  who presented to his cardiologist, Dr. Carvajal, endorsing exertional CP and SOB. Pt denies palpitations, dizziness/syncope, abdominal pain, N/V, fever/chills, orthopnea, PND, LE edema, URI symptoms, or sick contacts. TTE (12/3/21): LVEF 70%, mild-mod TR, mild MR, PASP 43mmHg. CCTA (12/27/2021): Ca score 345 (81st %tile), moderate stenosis dLAD, mild stenosis LM/D1/RCA/PDA/PLV, minimal stenisosi LCx/OM. NST (12/3/21): small, mild defect anterior wall.    In light of patient's risk factors, CCS Class III Anginal symptoms, and abnormal CCTA/NST, patient was referred for cardiac catheterization (1/28/222) revealing mild LM disease,    pLAD 20% stenosis, mLAD 80% calcified, mild D1 disease, pLCx disease, mild OM1 disease, RCA/RPDA mild disease. EDP 5 mmHG. Intervention: CISI/MARTÍN mLAD and MARTÍN pLCx. Patient prescribed ASA 81 mg daily and Plavix 75 mg daily to preferred pharmacy (in addition to scripts being sent to [facility] for patient to  tonight prior to discharge). In addition, patient to continue Atorvastatin increased 40 mg daily, Labetolol 200 mh BID, Irbesartan 300 mg daily. Patient will follow up with Dr. Carvajal within 72 hours for a check up.         1.	Follow-up with PMD/Cardiologist  Dr. Carvajal in 72 hours.  2.                 Post procedure labs/EKG reviewed and stable.    3.                 Pt given instructions on importance of taking antiplatelet medication.    4. 	Stable for discharge as per attending Dr. Mg after bed rest, pt voids, groin/wrist stable and 30 minutes of ambulation.  5.                 Prescriptions for Aspirin/Plavix e-prescribed adn submitted to patient's pharmacy.    6.                 Patient will continue statin Atorvastatin increased to 40 mg daily.   7.	Discharge forms signed and copies in chart

## 2022-01-31 PROBLEM — I25.10 ATHEROSCLEROTIC HEART DISEASE OF NATIVE CORONARY ARTERY WITHOUT ANGINA PECTORIS: Chronic | Status: ACTIVE | Noted: 2022-01-24

## 2022-01-31 PROBLEM — E78.5 HYPERLIPIDEMIA, UNSPECIFIED: Chronic | Status: ACTIVE | Noted: 2022-01-24

## 2022-01-31 PROBLEM — I10 ESSENTIAL (PRIMARY) HYPERTENSION: Chronic | Status: ACTIVE | Noted: 2022-01-24

## 2022-02-01 DIAGNOSIS — I25.84 CORONARY ATHEROSCLEROSIS DUE TO CALCIFIED CORONARY LESION: ICD-10-CM

## 2022-02-01 DIAGNOSIS — I25.110 ATHEROSCLEROTIC HEART DISEASE OF NATIVE CORONARY ARTERY WITH UNSTABLE ANGINA PECTORIS: ICD-10-CM

## 2022-02-01 DIAGNOSIS — R94.39 ABNORMAL RESULT OF OTHER CARDIOVASCULAR FUNCTION STUDY: ICD-10-CM

## 2022-02-03 ENCOUNTER — APPOINTMENT (OUTPATIENT)
Dept: CARDIOLOGY | Facility: CLINIC | Age: 77
End: 2022-02-03
Payer: MEDICARE

## 2022-02-03 VITALS
DIASTOLIC BLOOD PRESSURE: 70 MMHG | WEIGHT: 128 LBS | TEMPERATURE: 97.6 F | SYSTOLIC BLOOD PRESSURE: 157 MMHG | BODY MASS INDEX: 21.97 KG/M2 | HEART RATE: 70 BPM | OXYGEN SATURATION: 97 % | RESPIRATION RATE: 15 BRPM

## 2022-02-03 PROCEDURE — 99214 OFFICE O/P EST MOD 30 MIN: CPT

## 2022-02-03 PROCEDURE — 93000 ELECTROCARDIOGRAM COMPLETE: CPT

## 2022-02-03 RX ORDER — CLOPIDOGREL BISULFATE 75 MG/1
75 TABLET, FILM COATED ORAL DAILY
Qty: 30 | Refills: 5 | Status: ACTIVE | COMMUNITY
Start: 2022-02-03 | End: 1900-01-01

## 2022-02-03 NOTE — DISCUSSION/SUMMARY
[FreeTextEntry1] : 76 M HTN hyperlipidemia CAD s/p PCI for f/u after hospitalization.\par Continue ASA and plavix for CAD s/p PCI. \par Continue medications for HTN.\par Continue statin. \par Cath report reviewed.

## 2022-02-03 NOTE — REASON FOR VISIT
[Hyperlipidemia] : hyperlipidemia [Hypertension] : hypertension [Coronary Artery Disease] : coronary artery disease [FreeTextEntry1] : 76 M HTN CAD s/p PCI 25 y for f/u after hospitalization.\par \par

## 2022-02-03 NOTE — PHYSICAL EXAM
[Well Developed] : well developed [Well Nourished] : well nourished [No Acute Distress] : no acute distress [Normal Conjunctiva] : normal conjunctiva [Normal Venous Pressure] : normal venous pressure [No Carotid Bruit] : no carotid bruit [Normal S1, S2] : normal S1, S2 [No Rub] : no rub [No Gallop] : no gallop [Clear Lung Fields] : clear lung fields [Good Air Entry] : good air entry [No Respiratory Distress] : no respiratory distress  [Soft] : abdomen soft [Non Tender] : non-tender [No Masses/organomegaly] : no masses/organomegaly [Normal Bowel Sounds] : normal bowel sounds [Normal Gait] : normal gait [No Edema] : no edema [No Cyanosis] : no cyanosis [No Clubbing] : no clubbing [No Varicosities] : no varicosities [No Rash] : no rash [No Skin Lesions] : no skin lesions [Moves all extremities] : moves all extremities [No Focal Deficits] : no focal deficits [Normal Speech] : normal speech [Alert and Oriented] : alert and oriented [Normal memory] : normal memory [de-identified] : 1/6 ARIANE PAGE

## 2022-03-02 ENCOUNTER — APPOINTMENT (OUTPATIENT)
Dept: CARDIOLOGY | Facility: CLINIC | Age: 77
End: 2022-03-02
Payer: MEDICARE

## 2022-03-02 VITALS
BODY MASS INDEX: 21.85 KG/M2 | HEIGHT: 64 IN | DIASTOLIC BLOOD PRESSURE: 78 MMHG | HEART RATE: 75 BPM | RESPIRATION RATE: 12 BRPM | SYSTOLIC BLOOD PRESSURE: 126 MMHG | WEIGHT: 128 LBS

## 2022-03-02 PROCEDURE — 99214 OFFICE O/P EST MOD 30 MIN: CPT

## 2022-03-02 RX ORDER — ATORVASTATIN CALCIUM 40 MG/1
40 TABLET, FILM COATED ORAL
Qty: 30 | Refills: 5 | Status: ACTIVE | COMMUNITY
Start: 2021-12-15 | End: 1900-01-01

## 2022-03-02 NOTE — PHYSICAL EXAM
[Well Developed] : well developed [Well Nourished] : well nourished [No Acute Distress] : no acute distress [Normal Conjunctiva] : normal conjunctiva [Normal Venous Pressure] : normal venous pressure [No Carotid Bruit] : no carotid bruit [Normal S1, S2] : normal S1, S2 [No Rub] : no rub [No Gallop] : no gallop [Clear Lung Fields] : clear lung fields [Good Air Entry] : good air entry [No Respiratory Distress] : no respiratory distress  [Soft] : abdomen soft [Non Tender] : non-tender [No Masses/organomegaly] : no masses/organomegaly [Normal Bowel Sounds] : normal bowel sounds [Normal Gait] : normal gait [No Edema] : no edema [No Cyanosis] : no cyanosis [No Clubbing] : no clubbing [No Varicosities] : no varicosities [No Rash] : no rash [No Skin Lesions] : no skin lesions [Moves all extremities] : moves all extremities [No Focal Deficits] : no focal deficits [Normal Speech] : normal speech [Alert and Oriented] : alert and oriented [Normal memory] : normal memory [de-identified] : 1/6 ARIANE PAGE

## 2022-03-02 NOTE — HISTORY OF PRESENT ILLNESS
[FreeTextEntry1] :  \par 1. HTN: on medications.  \par 2. Hyperlipidemia: on statin. No SE are noted. \par 3. CAD s/p PCI: patient reports PCI 25 years ago at [facility]. He did not have cardiology f/u for a long period of time.\par He had an echo, NST and holter done: Echo showed normal LVEF. NST showed an anterior wall fixed defect. Holter showed PACs.\par Due to worsening CP, the patient had a cardiac CTA that showed multiple stenoses including a moderate to severe lesion in the LAD. He had a cardiac catheterization that showed severe LAD and LCx stenosis and he underwent PCI of both lesions in .\par He is now on ASA and plavix and his symptoms have improved.\par \par The patient received his COVID vaccine. \par \par No cough or fevers noted.

## 2022-03-02 NOTE — REASON FOR VISIT
[Hyperlipidemia] : hyperlipidemia [Hypertension] : hypertension [Coronary Artery Disease] : coronary artery disease [FreeTextEntry1] : 76 M HTN CAD s/p PCI 25 y hyperlipidemia for f/u. \par \par

## 2022-03-02 NOTE — DISCUSSION/SUMMARY
[FreeTextEntry1] : 76 M HTN hyperlipidemia CAD s/p PCI for f/u.\par Continue medications for HTN.\par Continue statin for hyperlipidemia.\par Continue ASA and plavix for CAD s/p PCI.\par Patient received his COVID vaccine.

## 2022-06-01 ENCOUNTER — APPOINTMENT (OUTPATIENT)
Dept: CARDIOLOGY | Facility: CLINIC | Age: 77
End: 2022-06-01
Payer: MEDICARE

## 2022-06-01 VITALS
WEIGHT: 125 LBS | BODY MASS INDEX: 21.34 KG/M2 | RESPIRATION RATE: 15 BRPM | HEART RATE: 68 BPM | DIASTOLIC BLOOD PRESSURE: 72 MMHG | SYSTOLIC BLOOD PRESSURE: 113 MMHG | HEIGHT: 64 IN

## 2022-06-01 PROCEDURE — 99214 OFFICE O/P EST MOD 30 MIN: CPT

## 2022-06-01 RX ORDER — NITROGLYCERIN 0.4 MG/1
0.4 TABLET SUBLINGUAL
Qty: 50 | Refills: 1 | Status: ACTIVE | COMMUNITY
Start: 2022-01-19 | End: 1900-01-01

## 2022-06-01 NOTE — PHYSICAL EXAM
[Well Developed] : well developed [Well Nourished] : well nourished [No Acute Distress] : no acute distress [Normal Conjunctiva] : normal conjunctiva [Normal Venous Pressure] : normal venous pressure [No Carotid Bruit] : no carotid bruit [Normal S1, S2] : normal S1, S2 [No Rub] : no rub [No Gallop] : no gallop [Clear Lung Fields] : clear lung fields [Good Air Entry] : good air entry [No Respiratory Distress] : no respiratory distress  [Soft] : abdomen soft [Non Tender] : non-tender [No Masses/organomegaly] : no masses/organomegaly [Normal Bowel Sounds] : normal bowel sounds [Normal Gait] : normal gait [No Edema] : no edema [No Cyanosis] : no cyanosis [No Clubbing] : no clubbing [No Varicosities] : no varicosities [No Rash] : no rash [No Skin Lesions] : no skin lesions [Moves all extremities] : moves all extremities [No Focal Deficits] : no focal deficits [Normal Speech] : normal speech [Alert and Oriented] : alert and oriented [Normal memory] : normal memory [de-identified] : 1/6 ARIANE PAGE

## 2022-06-01 NOTE — HISTORY OF PRESENT ILLNESS
[FreeTextEntry1] :  \par 1. HTN: on medications. Controlled.\par 2. Hyperlipidemia: on statin.  \par 3. CAD s/p PCI: patient reports PCI 25 years ago at [facility].  Due to worsening CP, the patient had a cardiac CTA that showed multiple stenoses including a moderate to severe lesion in the LAD. He had a cardiac catheterization that showed severe LAD and LCx stenosis and he underwent PCI of both lesions in .\par  \par \par The patient received his COVID vaccine. \par \par He denies CP or SOB. No cough or fevers noted.\par He is scheduled for dental surgery.\par \par

## 2022-06-01 NOTE — REASON FOR VISIT
[Hyperlipidemia] : hyperlipidemia [Hypertension] : hypertension [Coronary Artery Disease] : coronary artery disease [FreeTextEntry1] : 76 M HTN CAD s/p PCI 25 y hyperlipidemia for pre-op assessment.\par Echo \par

## 2022-11-07 ENCOUNTER — EMERGENCY (EMERGENCY)
Facility: HOSPITAL | Age: 77
LOS: 1 days | Discharge: HOME | End: 2022-11-07
Attending: EMERGENCY MEDICINE | Admitting: EMERGENCY MEDICINE
Payer: MEDICARE

## 2022-11-07 VITALS
HEART RATE: 89 BPM | OXYGEN SATURATION: 97 % | SYSTOLIC BLOOD PRESSURE: 148 MMHG | DIASTOLIC BLOOD PRESSURE: 66 MMHG | TEMPERATURE: 98 F | WEIGHT: 125 LBS | HEIGHT: 62 IN | RESPIRATION RATE: 16 BRPM

## 2022-11-07 VITALS
RESPIRATION RATE: 16 BRPM | SYSTOLIC BLOOD PRESSURE: 131 MMHG | HEART RATE: 75 BPM | DIASTOLIC BLOOD PRESSURE: 65 MMHG | OXYGEN SATURATION: 98 % | TEMPERATURE: 98 F

## 2022-11-07 LAB
ALBUMIN SERPL ELPH-MCNC: 3.9 G/DL — SIGNIFICANT CHANGE UP (ref 3.3–5)
ALP SERPL-CCNC: 60 U/L — SIGNIFICANT CHANGE UP (ref 30–120)
ALT FLD-CCNC: 17 U/L DA — SIGNIFICANT CHANGE UP (ref 10–60)
ANION GAP SERPL CALC-SCNC: 5 MMOL/L — SIGNIFICANT CHANGE UP (ref 5–17)
AST SERPL-CCNC: 17 U/L — SIGNIFICANT CHANGE UP (ref 10–40)
BASOPHILS # BLD AUTO: 0.02 K/UL — SIGNIFICANT CHANGE UP (ref 0–0.2)
BASOPHILS NFR BLD AUTO: 0.3 % — SIGNIFICANT CHANGE UP (ref 0–2)
BILIRUB SERPL-MCNC: 0.4 MG/DL — SIGNIFICANT CHANGE UP (ref 0.2–1.2)
BUN SERPL-MCNC: 22 MG/DL — SIGNIFICANT CHANGE UP (ref 7–23)
CALCIUM SERPL-MCNC: 8.7 MG/DL — SIGNIFICANT CHANGE UP (ref 8.4–10.5)
CHLORIDE SERPL-SCNC: 100 MMOL/L — SIGNIFICANT CHANGE UP (ref 96–108)
CO2 SERPL-SCNC: 29 MMOL/L — SIGNIFICANT CHANGE UP (ref 22–31)
CREAT SERPL-MCNC: 0.82 MG/DL — SIGNIFICANT CHANGE UP (ref 0.5–1.3)
EGFR: 91 ML/MIN/1.73M2 — SIGNIFICANT CHANGE UP
EOSINOPHIL # BLD AUTO: 0.04 K/UL — SIGNIFICANT CHANGE UP (ref 0–0.5)
EOSINOPHIL NFR BLD AUTO: 0.6 % — SIGNIFICANT CHANGE UP (ref 0–6)
GLUCOSE SERPL-MCNC: 173 MG/DL — HIGH (ref 70–99)
HCT VFR BLD CALC: 36.9 % — LOW (ref 39–50)
HGB BLD-MCNC: 12.5 G/DL — LOW (ref 13–17)
IMM GRANULOCYTES NFR BLD AUTO: 0.1 % — SIGNIFICANT CHANGE UP (ref 0–0.9)
LYMPHOCYTES # BLD AUTO: 1.47 K/UL — SIGNIFICANT CHANGE UP (ref 1–3.3)
LYMPHOCYTES # BLD AUTO: 20.5 % — SIGNIFICANT CHANGE UP (ref 13–44)
MCHC RBC-ENTMCNC: 29.2 PG — SIGNIFICANT CHANGE UP (ref 27–34)
MCHC RBC-ENTMCNC: 33.9 GM/DL — SIGNIFICANT CHANGE UP (ref 32–36)
MCV RBC AUTO: 86.2 FL — SIGNIFICANT CHANGE UP (ref 80–100)
MONOCYTES # BLD AUTO: 0.52 K/UL — SIGNIFICANT CHANGE UP (ref 0–0.9)
MONOCYTES NFR BLD AUTO: 7.3 % — SIGNIFICANT CHANGE UP (ref 2–14)
NEUTROPHILS # BLD AUTO: 5.1 K/UL — SIGNIFICANT CHANGE UP (ref 1.8–7.4)
NEUTROPHILS NFR BLD AUTO: 71.2 % — SIGNIFICANT CHANGE UP (ref 43–77)
NRBC # BLD: 0 /100 WBCS — SIGNIFICANT CHANGE UP (ref 0–0)
PLATELET # BLD AUTO: 209 K/UL — SIGNIFICANT CHANGE UP (ref 150–400)
POTASSIUM SERPL-MCNC: 4.2 MMOL/L — SIGNIFICANT CHANGE UP (ref 3.5–5.3)
POTASSIUM SERPL-SCNC: 4.2 MMOL/L — SIGNIFICANT CHANGE UP (ref 3.5–5.3)
PROT SERPL-MCNC: 7.4 G/DL — SIGNIFICANT CHANGE UP (ref 6–8.3)
RBC # BLD: 4.28 M/UL — SIGNIFICANT CHANGE UP (ref 4.2–5.8)
RBC # FLD: 12.5 % — SIGNIFICANT CHANGE UP (ref 10.3–14.5)
SARS-COV-2 RNA SPEC QL NAA+PROBE: SIGNIFICANT CHANGE UP
SODIUM SERPL-SCNC: 134 MMOL/L — LOW (ref 135–145)
TROPONIN I, HIGH SENSITIVITY RESULT: 9.4 NG/L — SIGNIFICANT CHANGE UP
WBC # BLD: 7.16 K/UL — SIGNIFICANT CHANGE UP (ref 3.8–10.5)
WBC # FLD AUTO: 7.16 K/UL — SIGNIFICANT CHANGE UP (ref 3.8–10.5)

## 2022-11-07 PROCEDURE — 85025 COMPLETE CBC W/AUTO DIFF WBC: CPT

## 2022-11-07 PROCEDURE — 99285 EMERGENCY DEPT VISIT HI MDM: CPT

## 2022-11-07 PROCEDURE — 93010 ELECTROCARDIOGRAM REPORT: CPT

## 2022-11-07 PROCEDURE — 96360 HYDRATION IV INFUSION INIT: CPT

## 2022-11-07 PROCEDURE — 80053 COMPREHEN METABOLIC PANEL: CPT

## 2022-11-07 PROCEDURE — 36415 COLL VENOUS BLD VENIPUNCTURE: CPT

## 2022-11-07 PROCEDURE — 93005 ELECTROCARDIOGRAM TRACING: CPT

## 2022-11-07 PROCEDURE — 71045 X-RAY EXAM CHEST 1 VIEW: CPT | Mod: 26

## 2022-11-07 PROCEDURE — 71045 X-RAY EXAM CHEST 1 VIEW: CPT

## 2022-11-07 PROCEDURE — 96361 HYDRATE IV INFUSION ADD-ON: CPT

## 2022-11-07 PROCEDURE — 87635 SARS-COV-2 COVID-19 AMP PRB: CPT

## 2022-11-07 PROCEDURE — 99285 EMERGENCY DEPT VISIT HI MDM: CPT | Mod: 25

## 2022-11-07 PROCEDURE — 84484 ASSAY OF TROPONIN QUANT: CPT

## 2022-11-07 RX ORDER — LABETALOL HCL 100 MG
1 TABLET ORAL
Qty: 0 | Refills: 0 | DISCHARGE

## 2022-11-07 RX ORDER — SODIUM CHLORIDE 9 MG/ML
1000 INJECTION INTRAMUSCULAR; INTRAVENOUS; SUBCUTANEOUS ONCE
Refills: 0 | Status: COMPLETED | OUTPATIENT
Start: 2022-11-07 | End: 2022-11-07

## 2022-11-07 RX ORDER — FLUOXETINE HCL 10 MG
0 CAPSULE ORAL
Qty: 0 | Refills: 0 | DISCHARGE

## 2022-11-07 RX ADMIN — SODIUM CHLORIDE 1000 MILLILITER(S): 9 INJECTION INTRAMUSCULAR; INTRAVENOUS; SUBCUTANEOUS at 12:30

## 2022-11-07 RX ADMIN — SODIUM CHLORIDE 1000 MILLILITER(S): 9 INJECTION INTRAMUSCULAR; INTRAVENOUS; SUBCUTANEOUS at 10:59

## 2022-11-07 NOTE — ED PROVIDER NOTE - PROGRESS NOTE DETAILS
pt improved. advised out pt follow up. All imaging and labs reviewed. all results reviewed with pt including abnormal results. pt given a copy of results. pt advised to follow up with pmd regarding abnormal results. All questions answered and concerns addressed. pt verbalized understanding and agreement with plan and dx. pt advised on next step and when/where to follow up. pt advised on all take home and otc medications. pt advised to follow up with PMD. pt advised to return to ed for worsenng symptoms including fever, cp, sob. will dc.

## 2022-11-07 NOTE — ED ADULT NURSE NOTE - NSICDXPASTMEDICALHX_GEN_ALL_CORE_FT
----- Message from Brad Faustin MD sent at 10/27/2022 12:12 AM EDT -----  Echo is good. Heart function is normal.  No change from prior study back in 2021. Please call with questions and/or concerns.   Thank you PAST MEDICAL HISTORY:  CAD (coronary artery disease)     HLD (hyperlipidemia)     HTN (hypertension)

## 2022-11-07 NOTE — ED PROVIDER NOTE - NS ED ATTENDING STATEMENT MOD
This was a shared visit with the BRITT. I reviewed and verified the documentation and independently performed the documented:

## 2022-11-07 NOTE — ED PROVIDER NOTE - OBJECTIVE STATEMENT
pt is a 78yo male with pmhx of cad s/p stents on plavix, prostate cancer, hld, presents with palpitations x today. pt had palpitations and noted his hr to be 100 so he took nitroglycerine and came to ed for eval. pt reports symptoms have since improved. pt denies cp, sob, n/v, abd pain. cardio in [place].

## 2022-11-07 NOTE — ED PROVIDER NOTE - PATIENT PORTAL LINK FT
You can access the FollowMyHealth Patient Portal offered by [facility] by registering at the following website: [email or web address]. By joining [employer]’s FollowMyHealth portal, you will also be able to view your health information using other applications (apps) compatible with our system.

## 2022-11-07 NOTE — ED PROVIDER NOTE - RELIEVING FACTORS
prescription medication Epidermal Autograft Text: The defect edges were debeveled with a #15 scalpel blade.  Given the location of the defect, shape of the defect and the proximity to free margins an epidermal autograft was deemed most appropriate.  Using a sterile surgical marker, the primary defect shape was transferred to the donor site. The epidermal graft was then harvested.  The skin graft was then placed in the primary defect and oriented appropriately.

## 2022-11-07 NOTE — ED PROVIDER NOTE - CARE PROVIDER_API CALL
Jimbo Mercer (MD)  Cardiovascular Disease; Internal Medicine  [address]  [place] [address]  Phone: [phone]  Fax: [phone]  Follow Up Time: 4-6 Days

## 2022-11-07 NOTE — ED PROVIDER NOTE - NSFOLLOWUPINSTRUCTIONS_ED_ALL_ED_FT
1. FOLLOW UP WITH YOUR PRIMARY DOCTOR IN 24-48 HOURS.   2. FOLLOW UP WITH ALL SPECIALIST DISCUSSED DURING YOUR VISIT.   3. TAKE ALL MEDICATIONS PRESCRIBED IN THE ER IF ANY ARE PRESCRIBED. CONTINUE YOUR HOME MEDICATIONS UNLESS OTHERWISE ADVISED DIFFERENTLY.   4. RETURN FOR WORSENING SYMPTOMS OR CONCERNS INCLUDING BUT NOT LIMITED TO FEVER, CHEST PAIN, OR TROUBLE BREATHING OR ANY OTHER CONCERNS      Palpitations      Palpitations are feelings that your heartbeat is not normal. Your heartbeat may feel like it is:  •Uneven (irregular).      •Faster than normal.      •Fluttering.      •Skipping a beat.      This is usually not a serious problem. However, a doctor will do tests and check your medical history to make sure that you do not have a serious heart problem.      Follow these instructions at home:    Watch for any changes in your condition. Tell your doctor about any changes. Take these actions to help manage your symptoms:    Eating and drinking     Follow instructions from your doctor about things to eat and drink. You may be told to avoid these things:   •Drinks that have caffeine in them, such as coffee, tea, soft drinks, and energy drinks.      •Chocolate.      •Alcohol.      •Diet pills.        Lifestyle      A person sitting on the floor doing yoga.      A sign telling the reader not to smoke.  •Try to lower your stress. These things can help you relax:  •Yoga.      •Deep breathing and meditation.      •Guided imagery. This is using words and images to create positive thoughts.      •Exercise, including swimming, jogging, and walking. Tell your doctor if you have more abnormal heartbeats when you are active. If you have chest pain or feel short of breath with exercise, do not keep doing the exercise until you are seen by your doctor.      •Biofeedback. This is using your mind to control things in your body, such as your heartbeat.        •Get plenty of rest and sleep. Keep a regular bed time.      •Do not use drugs, such as cocaine or ecstasy. Do not use marijuana.      •Do not smoke or use any products that contain nicotine or tobacco. If you need help quitting, ask your doctor.      General instructions    •Take over-the-counter and prescription medicines only as told by your doctor.      •Keep all follow-up visits. You may need more tests if palpitations do not go away or get worse.        Contact a doctor if:    •You keep having fast or uneven heartbeats for a long time.      •Your symptoms happen more often.        Get help right away if:    •You have chest pain.      •You feel short of breath.      •You have a very bad headache.      •You feel dizzy.      •You faint.      These symptoms may be an emergency. Get help right away. Call your local emergency services (911 in the [place]   • Do not wait to see if the symptoms will go away.        •  Do not drive yourself to the hospital.         Summary    •Palpitations are feelings that your heartbeat is uneven or faster than normal. It may feel like your heart is fluttering or skipping a beat.      •Avoid food and drinks that may cause this condition. These include caffeine, chocolate, and alcohol.      •Try to lower your stress. Do not smoke or use drugs.      •Get help right away if you faint, feel dizzy, feel short of breath, have chest pain, or have a very bad headache.      This information is not intended to replace advice given to you by your health care provider. Make sure you discuss any questions you have with your health care provider.      Document Revised: 05/11/2022 Document Reviewed: 05/11/2022    Elsevier Patient Education © 2022 [employer]

## 2022-11-07 NOTE — ED PROVIDER NOTE - CLINICAL SUMMARY MEDICAL DECISION MAKING FREE TEXT BOX
78yo male with pmhx of cad s/p stents on plavix, prostate cancer, hld, presents with palpitations x today. pt had palpitations and noted his hr to be 100 so he took nitroglycerine and came to ed for eval. pt reports symptoms have since improved. pt denies cp, sob, n/v, abd pain. cardio in [place].  VSS Afebrile, NAD  HEENT - clear  PERRL EOMI  Neck supple  lungs clear  Cor S1S2 RR - MGR  Abd soft nontender, no mass or HSM, no rebound  Ext FROM intact, no edema  Neuro Intact, no deficits.  Skin Warm and dry no rash.    Imp- Palpitations,   Plan - Cardiac workup. If neg will refer to outpt cardio for further eval.    I performed a history and physical exam of the patient and discussed their management with the advanced care provider. I reviewed the advanced care provider's note and agree with the documented findings and plan of care. My medical decision making and objective findings are found above.

## 2022-11-07 NOTE — ED ADULT NURSE NOTE - OBJECTIVE STATEMENT
77 YOM A&Ox3 presents for palpitations. pt states HR was fast at home, it was over 100. pt denies sob, chest pain, n/v/d, dizziness. safety maintained.

## 2023-06-30 NOTE — H&P ADULT - NSICDXNOFAMILYHX_GEN_ALL_CORE
Quality 226: Preventive Care And Screening: Tobacco Use: Screening And Cessation Intervention: Patient screened for tobacco use and is an ex/non-smoker Quality 47: Advance Care Plan: Advance care planning not documented, reason not otherwise specified. Quality 110: Preventive Care And Screening: Influenza Immunization: Influenza Immunization not Administered because Patient Refused. Quality 111:Pneumonia Vaccination Status For Older Adults: Pneumococcal Vaccination not Administered or Previously Received, Reason not Otherwise Specified Detail Level: Detailed <-- Click to add NO pertinent Family History

## 2024-02-05 VITALS
WEIGHT: 119.05 LBS | HEART RATE: 87 BPM | HEIGHT: 64.96 IN | TEMPERATURE: 98 F | RESPIRATION RATE: 15 BRPM | DIASTOLIC BLOOD PRESSURE: 67 MMHG | OXYGEN SATURATION: 99 % | SYSTOLIC BLOOD PRESSURE: 145 MMHG

## 2024-02-05 RX ORDER — SODIUM CHLORIDE 9 MG/ML
1000 INJECTION INTRAMUSCULAR; INTRAVENOUS; SUBCUTANEOUS
Refills: 0 | Status: DISCONTINUED | OUTPATIENT
Start: 2024-02-09 | End: 2024-02-23

## 2024-02-05 RX ORDER — CHLORHEXIDINE GLUCONATE 213 G/1000ML
1 SOLUTION TOPICAL ONCE
Refills: 0 | Status: DISCONTINUED | OUTPATIENT
Start: 2024-02-09 | End: 2024-02-23

## 2024-02-05 NOTE — H&P ADULT - NSICDXPASTMEDICALHX_GEN_ALL_CORE_FT
PAST MEDICAL HISTORY:  BPH (benign prostatic hyperplasia)     CAD (coronary artery disease)     HLD (hyperlipidemia)     HTN (hypertension)     Prostate cancer

## 2024-02-05 NOTE — H&P ADULT - ASSESSMENT
78M, Wolof speaking, former smoker, w/ PMHx of HTN, HLD, CAD s/p multiple PCIs (most recently 2022 w/ MARTÍN mLAD and MARTÍN pLCx), and Prostate CA s/p surgery, initially presented to outpt cardiologist c/o worsening CP which occurs intermittently, even at rest; associated with intermittent palpitations, SOB/HAGAN, and cough, who, in light of risk factors, CCS class IV anginal symptoms and abnormal CCTA, pt now presents to [facility] for recommended cardiac catheterization with possible intervention if clinically indicated.       EKbpm NSR with 1mm NORBERTO in V2; flattened TW and low voltage QRS in AVL. 		  ASA 	III  Mallampati class:  I  Anginal Class:  IV    -No Known Allergies    -H/H = 13.4/40.2  . Pt denies BRBPR, hematuria, hematochezia, melena. Pt endorses compliance w/ home Aspirin 81mg daily, stating last dose was yesterday 2.8.24. Pt loaded w/ ASA 81mg x1 and Plavix 600mg x1  -BUN/Cr = 20/0.79  . EF 75% by 23 TTE. Euvolemic on exam. IV NS @ 250cc bolus followed by 75cc/hr x 2hrs started pre procedure    Sedation Plan:   Moderate  Patient Is Suitable Candidate For Sedation?     Yes    Risks & benefits of procedure and alternative therapy have been explained to the patient by Fellow utilizing Wolof  # [id number], including but not limited to: allergic reaction, bleeding with possible need for blood transfusion, infection, renal and vascular compromise, limb damage, arrhythmia, stroke, vessel dissection/perforation, myocardial infarction, and emergent CABG. Informed consent obtained at bedside and included in chart.

## 2024-02-05 NOTE — H&P ADULT - NSICDXPASTSURGICALHX_GEN_ALL_CORE_FT
PAST SURGICAL HISTORY:  No significant past surgical history      PAST SURGICAL HISTORY:  H/O left inguinal hernia repair     S/P TURP (transurethral resection of prostate)     Stented coronary artery

## 2024-02-05 NOTE — H&P ADULT - HISTORY OF PRESENT ILLNESS
Cardiologist - Dr. Carvajal  Pharmacy -  Escort -    78M, Vietnamese speaking, former smoker, w/ PMHx of HTN, HLD, CAD s/p multiple PCIs (most recently 1/2022 w/ MARTÍN mLAD and MARTÍN pLCx), and Prostate CA s/p surgery, initially presented to outpt cardiologist c/o worsening CP ___. He denies any ___ palpitations, dizziness, syncope, diaphoresis, fatigue, LE edema, HAGAN, orthopnea, PND, N/V/D, abd pain, cough, congestion, fever, chills or recent sick contact.     CCTA 1/29/24: extensive coronary calcifications, mod stenosis in mRCA, dRCA and posterior LV coronary artery, patent prior stents and mild stenosis elsewhere.   TTE 8/17/23: LVEF 76% and mild AR.  Cardiac Cath @ [facility] 1/28/22: CSI/MARTÍN mLAD (80%) and MARTÍN pLCx (70%); LM mild, pLAD 30%, D1 mild diffuse, OM1 mild diffuse, RCA/RPDA mild diffuse.    In light of pts risk factors, CCS class __ anginal symptoms and abnormal CCTA, pt now presents to [facility] for recommended cardiac catheterization with possible intervention if clinically indicated.  Cardiologist - Dr. Carvajal  [facility]  Escort - son    78M, Greenlandic speaking, former smoker, w/ PMHx of HTN, HLD, CAD s/p multiple PCIs (most recently 1/2022 w/ MARTÍN mLAD and MARTÍN pLCx), and Prostate CA s/p surgery, initially presented to outpt cardiologist c/o worsening CP which occurs intermittently, even at rest; associated with intermittent palpitations, SOB/HAGAN, and cough. Pt denies diaphoresis, orthopnea/PND, leg swelling, dizziness, LOC, fall, syncope, N/V/D, fever/chills/sick contact, rash, hematuria, BRBPR, melena/hematochezia.    CCTA 1/29/24: extensive coronary calcifications, mod stenosis in mRCA, dRCA and posterior LV coronary artery, patent prior stents and mild stenosis elsewhere.   TTE 8/17/23: LVEF 76% and mild AR.  Cardiac Cath @ [facility] 1/28/22: CSI/MARTÍN mLAD (80%) and MARTÍN pLCx (70%); LM mild, pLAD 30%, D1 mild diffuse, OM1 mild diffuse, RCA/RPDA mild diffuse.    In light of risk factors, CCS class IV anginal symptoms and abnormal CCTA, pt now presents to [facility] for recommended cardiac catheterization with possible intervention if clinically indicated.

## 2024-02-05 NOTE — H&P ADULT - NSHPLABSRESULTS_GEN_ALL_CORE
13.4   4.40  )-----------( 237      ( 09 Feb 2024 07:42 )             40.2       02-09    137  |  103  |  20  ----------------------------<  116<H>  4.1   |  24  |  0.79    Ca    9.0      09 Feb 2024 07:42  Mg     1.9     02-09    TPro  7.5  /  Alb  4.4  /  TBili  0.6  /  DBili  x   /  AST  12  /  ALT  7<L>  /  AlkPhos  66  02-09      PT/INR - ( 09 Feb 2024 07:42 )   PT: 11.7 sec;   INR: 1.03          PTT - ( 09 Feb 2024 07:42 )  PTT:32.8 sec    CARDIAC MARKERS ( 09 Feb 2024 07:42 )  x     / x     / 52 U/L / x     / 1.0 ng/mL        Urinalysis Basic - ( 09 Feb 2024 07:42 )    Color: x / Appearance: x / SG: x / pH: x  Gluc: 116 mg/dL / Ketone: x  / Bili: x / Urobili: x   Blood: x / Protein: x / Nitrite: x   Leuk Esterase: x / RBC: x / WBC x   Sq Epi: x / Non Sq Epi: x / Bacteria: x

## 2024-02-09 ENCOUNTER — OUTPATIENT (OUTPATIENT)
Dept: OUTPATIENT SERVICES | Facility: HOSPITAL | Age: 79
LOS: 1 days | Discharge: HOME | End: 2024-02-09
Payer: MEDICARE

## 2024-02-09 DIAGNOSIS — Z90.79 ACQUIRED ABSENCE OF OTHER GENITAL ORGAN(S): Chronic | ICD-10-CM

## 2024-02-09 DIAGNOSIS — Z98.890 OTHER SPECIFIED POSTPROCEDURAL STATES: Chronic | ICD-10-CM

## 2024-02-09 DIAGNOSIS — Z95.5 PRESENCE OF CORONARY ANGIOPLASTY IMPLANT AND GRAFT: Chronic | ICD-10-CM

## 2024-02-09 LAB
A1C WITH ESTIMATED AVERAGE GLUCOSE RESULT: 5.6 % — SIGNIFICANT CHANGE UP (ref 4–5.6)
ALBUMIN SERPL ELPH-MCNC: 4.4 G/DL — SIGNIFICANT CHANGE UP (ref 3.3–5)
ALP SERPL-CCNC: 66 U/L — SIGNIFICANT CHANGE UP (ref 40–120)
ALT FLD-CCNC: 7 U/L — LOW (ref 10–45)
ANION GAP SERPL CALC-SCNC: 10 MMOL/L — SIGNIFICANT CHANGE UP (ref 5–17)
APTT BLD: 32.8 SEC — SIGNIFICANT CHANGE UP (ref 24.5–35.6)
AST SERPL-CCNC: 12 U/L — SIGNIFICANT CHANGE UP (ref 10–40)
BASE EXCESS BLDV CALC-SCNC: 3 MMOL/L — SIGNIFICANT CHANGE UP (ref -2–3)
BASOPHILS # BLD AUTO: 0.03 K/UL — SIGNIFICANT CHANGE UP (ref 0–0.2)
BASOPHILS NFR BLD AUTO: 0.7 % — SIGNIFICANT CHANGE UP (ref 0–2)
BILIRUB SERPL-MCNC: 0.6 MG/DL — SIGNIFICANT CHANGE UP (ref 0.2–1.2)
BUN SERPL-MCNC: 20 MG/DL — SIGNIFICANT CHANGE UP (ref 7–23)
CA-I SERPL-SCNC: 1.2 MMOL/L — SIGNIFICANT CHANGE UP (ref 1.15–1.33)
CALCIUM SERPL-MCNC: 9 MG/DL — SIGNIFICANT CHANGE UP (ref 8.4–10.5)
CHLORIDE SERPL-SCNC: 103 MMOL/L — SIGNIFICANT CHANGE UP (ref 96–108)
CHOLEST SERPL-MCNC: 159 MG/DL — SIGNIFICANT CHANGE UP
CK MB CFR SERPL CALC: 1 NG/ML — SIGNIFICANT CHANGE UP (ref 0–6.7)
CK SERPL-CCNC: 52 U/L — SIGNIFICANT CHANGE UP (ref 30–200)
CO2 BLDV-SCNC: 29.9 MMOL/L — HIGH (ref 22–26)
CO2 SERPL-SCNC: 24 MMOL/L — SIGNIFICANT CHANGE UP (ref 22–31)
COHGB MFR BLDV: 1.2 % — SIGNIFICANT CHANGE UP
CREAT SERPL-MCNC: 0.79 MG/DL — SIGNIFICANT CHANGE UP (ref 0.5–1.3)
EGFR: 92 ML/MIN/1.73M2 — SIGNIFICANT CHANGE UP
EOSINOPHIL # BLD AUTO: 0.03 K/UL — SIGNIFICANT CHANGE UP (ref 0–0.5)
EOSINOPHIL NFR BLD AUTO: 0.7 % — SIGNIFICANT CHANGE UP (ref 0–6)
ESTIMATED AVERAGE GLUCOSE: 114 MG/DL — SIGNIFICANT CHANGE UP (ref 68–114)
GAS PNL BLDV: 137 MMOL/L — SIGNIFICANT CHANGE UP (ref 136–145)
GLUCOSE BLDV-MCNC: 113 MG/DL — HIGH (ref 70–99)
GLUCOSE SERPL-MCNC: 116 MG/DL — HIGH (ref 70–99)
HCO3 BLDV-SCNC: 28 MMOL/L — SIGNIFICANT CHANGE UP (ref 22–29)
HCT VFR BLD CALC: 40.2 % — SIGNIFICANT CHANGE UP (ref 39–50)
HCT VFR BLDA CALC: 41 % — SIGNIFICANT CHANGE UP
HDLC SERPL-MCNC: 44 MG/DL — SIGNIFICANT CHANGE UP
HGB BLD CALC-MCNC: 13.6 G/DL — SIGNIFICANT CHANGE UP (ref 12.6–17.4)
HGB BLD-MCNC: 13.4 G/DL — SIGNIFICANT CHANGE UP (ref 13–17)
IMM GRANULOCYTES NFR BLD AUTO: 0.2 % — SIGNIFICANT CHANGE UP (ref 0–0.9)
INR BLD: 1.03 — SIGNIFICANT CHANGE UP (ref 0.85–1.18)
LIPID PNL WITH DIRECT LDL SERPL: 99 MG/DL — SIGNIFICANT CHANGE UP
LYMPHOCYTES # BLD AUTO: 1.76 K/UL — SIGNIFICANT CHANGE UP (ref 1–3.3)
LYMPHOCYTES # BLD AUTO: 40 % — SIGNIFICANT CHANGE UP (ref 13–44)
MAGNESIUM SERPL-MCNC: 1.9 MG/DL — SIGNIFICANT CHANGE UP (ref 1.6–2.6)
MCHC RBC-ENTMCNC: 29.5 PG — SIGNIFICANT CHANGE UP (ref 27–34)
MCHC RBC-ENTMCNC: 33.3 GM/DL — SIGNIFICANT CHANGE UP (ref 32–36)
MCV RBC AUTO: 88.5 FL — SIGNIFICANT CHANGE UP (ref 80–100)
METHGB MFR BLDV: 0.4 % — SIGNIFICANT CHANGE UP
MONOCYTES # BLD AUTO: 0.36 K/UL — SIGNIFICANT CHANGE UP (ref 0–0.9)
MONOCYTES NFR BLD AUTO: 8.2 % — SIGNIFICANT CHANGE UP (ref 2–14)
NEUTROPHILS # BLD AUTO: 2.21 K/UL — SIGNIFICANT CHANGE UP (ref 1.8–7.4)
NEUTROPHILS NFR BLD AUTO: 50.2 % — SIGNIFICANT CHANGE UP (ref 43–77)
NON HDL CHOLESTEROL: 115 MG/DL — SIGNIFICANT CHANGE UP
NRBC # BLD: 0 /100 WBCS — SIGNIFICANT CHANGE UP (ref 0–0)
PCO2 BLDV: 46 MMHG — SIGNIFICANT CHANGE UP (ref 42–55)
PH BLDV: 7.4 — SIGNIFICANT CHANGE UP (ref 7.32–7.43)
PLATELET # BLD AUTO: 237 K/UL — SIGNIFICANT CHANGE UP (ref 150–400)
PO2 BLDV: 48 MMHG — HIGH (ref 25–45)
POTASSIUM BLDV-SCNC: 4.1 MMOL/L — SIGNIFICANT CHANGE UP (ref 3.5–5.1)
POTASSIUM SERPL-MCNC: 4.1 MMOL/L — SIGNIFICANT CHANGE UP (ref 3.5–5.3)
POTASSIUM SERPL-SCNC: 4.1 MMOL/L — SIGNIFICANT CHANGE UP (ref 3.5–5.3)
PROT SERPL-MCNC: 7.5 G/DL — SIGNIFICANT CHANGE UP (ref 6–8.3)
PROTHROM AB SERPL-ACNC: 11.7 SEC — SIGNIFICANT CHANGE UP (ref 9.5–13)
RBC # BLD: 4.54 M/UL — SIGNIFICANT CHANGE UP (ref 4.2–5.8)
RBC # FLD: 12 % — SIGNIFICANT CHANGE UP (ref 10.3–14.5)
SAO2 % BLDV: 80 % — SIGNIFICANT CHANGE UP (ref 67–88)
SODIUM SERPL-SCNC: 137 MMOL/L — SIGNIFICANT CHANGE UP (ref 135–145)
TRIGL SERPL-MCNC: 80 MG/DL — SIGNIFICANT CHANGE UP
WBC # BLD: 4.4 K/UL — SIGNIFICANT CHANGE UP (ref 3.8–10.5)
WBC # FLD AUTO: 4.4 K/UL — SIGNIFICANT CHANGE UP (ref 3.8–10.5)

## 2024-02-09 PROCEDURE — 93458 L HRT ARTERY/VENTRICLE ANGIO: CPT | Mod: 26

## 2024-02-09 PROCEDURE — 99152 MOD SED SAME PHYS/QHP 5/>YRS: CPT

## 2024-02-09 PROCEDURE — C1894: CPT

## 2024-02-09 PROCEDURE — C1769: CPT

## 2024-02-09 PROCEDURE — 80061 LIPID PANEL: CPT

## 2024-02-09 PROCEDURE — 82803 BLOOD GASES ANY COMBINATION: CPT

## 2024-02-09 PROCEDURE — 99153 MOD SED SAME PHYS/QHP EA: CPT

## 2024-02-09 PROCEDURE — 80053 COMPREHEN METABOLIC PANEL: CPT

## 2024-02-09 PROCEDURE — 0523T NTRAPX C FFR W/3D FUNCJL MAP: CPT

## 2024-02-09 PROCEDURE — 85025 COMPLETE CBC W/AUTO DIFF WBC: CPT

## 2024-02-09 PROCEDURE — 85610 PROTHROMBIN TIME: CPT

## 2024-02-09 PROCEDURE — 83735 ASSAY OF MAGNESIUM: CPT

## 2024-02-09 PROCEDURE — 85730 THROMBOPLASTIN TIME PARTIAL: CPT

## 2024-02-09 PROCEDURE — 82550 ASSAY OF CK (CPK): CPT

## 2024-02-09 PROCEDURE — 36415 COLL VENOUS BLD VENIPUNCTURE: CPT

## 2024-02-09 PROCEDURE — C1887: CPT

## 2024-02-09 PROCEDURE — 93458 L HRT ARTERY/VENTRICLE ANGIO: CPT

## 2024-02-09 PROCEDURE — 83036 HEMOGLOBIN GLYCOSYLATED A1C: CPT

## 2024-02-09 PROCEDURE — 82553 CREATINE MB FRACTION: CPT

## 2024-02-09 RX ORDER — SODIUM CHLORIDE 9 MG/ML
250 INJECTION INTRAMUSCULAR; INTRAVENOUS; SUBCUTANEOUS ONCE
Refills: 0 | Status: COMPLETED | OUTPATIENT
Start: 2024-02-09 | End: 2024-02-09

## 2024-02-09 RX ORDER — MAGNESIUM OXIDE 400 MG ORAL TABLET 241.3 MG
400 TABLET ORAL ONCE
Refills: 0 | Status: COMPLETED | OUTPATIENT
Start: 2024-02-09 | End: 2024-02-09

## 2024-02-09 RX ORDER — SODIUM CHLORIDE 9 MG/ML
1000 INJECTION INTRAMUSCULAR; INTRAVENOUS; SUBCUTANEOUS
Refills: 0 | Status: DISCONTINUED | OUTPATIENT
Start: 2024-02-09 | End: 2024-02-23

## 2024-02-09 RX ORDER — ASPIRIN/CALCIUM CARB/MAGNESIUM 324 MG
81 TABLET ORAL ONCE
Refills: 0 | Status: COMPLETED | OUTPATIENT
Start: 2024-02-09 | End: 2024-02-09

## 2024-02-09 RX ORDER — NITROGLYCERIN 6.5 MG
1 CAPSULE, EXTENDED RELEASE ORAL
Qty: 0 | Refills: 0 | DISCHARGE

## 2024-02-09 RX ORDER — TAMSULOSIN HYDROCHLORIDE 0.4 MG/1
1 CAPSULE ORAL
Qty: 0 | Refills: 0 | DISCHARGE

## 2024-02-09 RX ORDER — IRBESARTAN 75 MG/1
1 TABLET ORAL
Qty: 0 | Refills: 0 | DISCHARGE

## 2024-02-09 RX ORDER — ATORVASTATIN CALCIUM 80 MG/1
1 TABLET, FILM COATED ORAL
Qty: 0 | Refills: 0 | DISCHARGE

## 2024-02-09 RX ORDER — CLOPIDOGREL BISULFATE 75 MG/1
600 TABLET, FILM COATED ORAL ONCE
Refills: 0 | Status: COMPLETED | OUTPATIENT
Start: 2024-02-09 | End: 2024-02-09

## 2024-02-09 RX ORDER — FLUOXETINE HCL 10 MG
1 CAPSULE ORAL
Refills: 0 | DISCHARGE

## 2024-02-09 RX ADMIN — SODIUM CHLORIDE 500 MILLILITER(S): 9 INJECTION INTRAMUSCULAR; INTRAVENOUS; SUBCUTANEOUS at 08:52

## 2024-02-09 RX ADMIN — CLOPIDOGREL BISULFATE 600 MILLIGRAM(S): 75 TABLET, FILM COATED ORAL at 08:52

## 2024-02-09 RX ADMIN — SODIUM CHLORIDE 75 MILLILITER(S): 9 INJECTION INTRAMUSCULAR; INTRAVENOUS; SUBCUTANEOUS at 08:52

## 2024-02-09 RX ADMIN — MAGNESIUM OXIDE 400 MG ORAL TABLET 400 MILLIGRAM(S): 241.3 TABLET ORAL at 08:51

## 2024-02-09 RX ADMIN — SODIUM CHLORIDE 120 MILLILITER(S): 9 INJECTION INTRAMUSCULAR; INTRAVENOUS; SUBCUTANEOUS at 11:23

## 2024-02-09 RX ADMIN — Medication 81 MILLIGRAM(S): at 08:52

## 2024-02-09 NOTE — PROGRESS NOTE ADULT - SUBJECTIVE AND OBJECTIVE BOX
Interventional Cardiology PA SDA Discharge Note    Patient without complaints. Ambulated and voided without difficulties    Afebrile, VSS    Ext: Right Radial :  No hematoma, bleeding, dressing; C/D/I      Pulses:    intact RAD/DP/PT to baseline     A/P:    78M, Turkish speaking, former smoker, w/ PMHx of HTN, HLD, CAD s/p multiple PCIs (most recently 1/2022 w/ MARTÍN mLAD and MARTÍN pLCx), and Prostate CA s/p surgery, initially presented to outpt cardiologist c/o worsening CP which occurs intermittently, even at rest; associated with intermittent palpitations, SOB/HAGAN, and cough whom in light of risk factors, CCS class IV anginal symptoms and abnormal CCTA, pt now presents to [facility] for recommended cardiac catheterization with possible intervention if clinically indicated.     Pt underwent LHC 2/9/24 revealing nonobstructive CAD; RCA 40-50%, LAD patent stent, LCX patent stent; EDP 6 mm hg. RRA TR band removed @ 12:30 pm.    1.	Stable for discharge as per attending Dr. Mg after bed rest, pt voids, groin/wrist stable and 30 minutes of ambulation.  2.	Follow-up with PMD/Cardiologist Dr. Carvajal in 1-2 weeks  3.	Discharged forms signed and copies in chart

## 2024-02-16 DIAGNOSIS — I25.110 ATHEROSCLEROTIC HEART DISEASE OF NATIVE CORONARY ARTERY WITH UNSTABLE ANGINA PECTORIS: ICD-10-CM

## 2024-02-16 DIAGNOSIS — R93.1 ABNORMAL FINDINGS ON DIAGNOSTIC IMAGING OF HEART AND CORONARY CIRCULATION: ICD-10-CM
